# Patient Record
Sex: FEMALE | Race: WHITE | NOT HISPANIC OR LATINO | Employment: OTHER | URBAN - METROPOLITAN AREA
[De-identification: names, ages, dates, MRNs, and addresses within clinical notes are randomized per-mention and may not be internally consistent; named-entity substitution may affect disease eponyms.]

---

## 2017-01-23 ENCOUNTER — APPOINTMENT (OUTPATIENT)
Dept: MRI IMAGING | Facility: CLINIC | Age: 52
End: 2017-01-23
Attending: EMERGENCY MEDICINE
Payer: COMMERCIAL

## 2017-01-23 ENCOUNTER — HOSPITAL ENCOUNTER (EMERGENCY)
Facility: CLINIC | Age: 52
Discharge: HOME | End: 2017-01-23
Attending: EMERGENCY MEDICINE | Admitting: EMERGENCY MEDICINE
Payer: COMMERCIAL

## 2017-01-23 VITALS
DIASTOLIC BLOOD PRESSURE: 65 MMHG | OXYGEN SATURATION: 96 % | SYSTOLIC BLOOD PRESSURE: 103 MMHG | HEART RATE: 90 BPM | RESPIRATION RATE: 16 BRPM | TEMPERATURE: 97.5 F

## 2017-01-23 DIAGNOSIS — H81.12 BPPV (BENIGN PAROXYSMAL POSITIONAL VERTIGO), LEFT: ICD-10-CM

## 2017-01-23 LAB
ANION GAP SERPL CALCULATED.3IONS-SCNC: 7 MMOL/L (ref 3–14)
BUN SERPL-MCNC: 13 MG/DL (ref 7–30)
CALCIUM SERPL-MCNC: 8.9 MG/DL (ref 8.5–10.1)
CHLORIDE SERPL-SCNC: 105 MMOL/L (ref 94–109)
CO2 SERPL-SCNC: 29 MMOL/L (ref 20–32)
CREAT SERPL-MCNC: 0.93 MG/DL (ref 0.52–1.04)
GFR SERPL CREATININE-BSD FRML MDRD: 64 ML/MIN/1.7M2
GLUCOSE SERPL-MCNC: 96 MG/DL (ref 70–99)
POTASSIUM SERPL-SCNC: 4.1 MMOL/L (ref 3.4–5.3)
SODIUM SERPL-SCNC: 141 MMOL/L (ref 133–144)

## 2017-01-23 PROCEDURE — 99285 EMERGENCY DEPT VISIT HI MDM: CPT | Mod: 25

## 2017-01-23 PROCEDURE — 25500064 ZZH RX 255 OP 636: Performed by: EMERGENCY MEDICINE

## 2017-01-23 PROCEDURE — 25000128 H RX IP 250 OP 636: Performed by: EMERGENCY MEDICINE

## 2017-01-23 PROCEDURE — 70553 MRI BRAIN STEM W/O & W/DYE: CPT

## 2017-01-23 PROCEDURE — 96375 TX/PRO/DX INJ NEW DRUG ADDON: CPT

## 2017-01-23 PROCEDURE — 96374 THER/PROPH/DIAG INJ IV PUSH: CPT

## 2017-01-23 PROCEDURE — A9585 GADOBUTROL INJECTION: HCPCS | Performed by: EMERGENCY MEDICINE

## 2017-01-23 PROCEDURE — 25900017 H RX MED GY IP 259 OP 259 PS 637: Performed by: EMERGENCY MEDICINE

## 2017-01-23 PROCEDURE — 25000125 ZZHC RX 250: Performed by: EMERGENCY MEDICINE

## 2017-01-23 PROCEDURE — 96376 TX/PRO/DX INJ SAME DRUG ADON: CPT

## 2017-01-23 PROCEDURE — 80048 BASIC METABOLIC PNL TOTAL CA: CPT | Performed by: EMERGENCY MEDICINE

## 2017-01-23 RX ORDER — DIAZEPAM 10 MG/2ML
2.5 INJECTION, SOLUTION INTRAMUSCULAR; INTRAVENOUS ONCE
Status: DISCONTINUED | OUTPATIENT
Start: 2017-01-23 | End: 2017-01-23

## 2017-01-23 RX ORDER — DIAZEPAM 10 MG/2ML
2.5 INJECTION, SOLUTION INTRAMUSCULAR; INTRAVENOUS ONCE
Status: COMPLETED | OUTPATIENT
Start: 2017-01-23 | End: 2017-01-23

## 2017-01-23 RX ORDER — DIAZEPAM 5 MG
5 TABLET ORAL EVERY 8 HOURS PRN
Qty: 15 TABLET | Refills: 0 | Status: SHIPPED | OUTPATIENT
Start: 2017-01-23

## 2017-01-23 RX ORDER — GADOBUTROL 604.72 MG/ML
4.5 INJECTION INTRAVENOUS ONCE
Status: COMPLETED | OUTPATIENT
Start: 2017-01-23 | End: 2017-01-23

## 2017-01-23 RX ORDER — METOCLOPRAMIDE HYDROCHLORIDE 5 MG/ML
5 INJECTION INTRAMUSCULAR; INTRAVENOUS ONCE
Status: COMPLETED | OUTPATIENT
Start: 2017-01-23 | End: 2017-01-23

## 2017-01-23 RX ORDER — MECLIZINE HYDROCHLORIDE 25 MG/1
25 TABLET ORAL EVERY 6 HOURS PRN
Qty: 15 TABLET | Refills: 0 | Status: SHIPPED | OUTPATIENT
Start: 2017-01-23

## 2017-01-23 RX ORDER — MECLIZINE HYDROCHLORIDE 25 MG/1
25 TABLET ORAL ONCE
Status: COMPLETED | OUTPATIENT
Start: 2017-01-23 | End: 2017-01-23

## 2017-01-23 RX ORDER — ONDANSETRON 4 MG/1
4 TABLET, ORALLY DISINTEGRATING ORAL ONCE
Status: COMPLETED | OUTPATIENT
Start: 2017-01-23 | End: 2017-01-23

## 2017-01-23 RX ADMIN — MECLIZINE HYDROCHLORIDE 25 MG: 25 TABLET ORAL at 11:36

## 2017-01-23 RX ADMIN — DIAZEPAM 2.5 MG: 5 INJECTION, SOLUTION INTRAMUSCULAR; INTRAVENOUS at 13:03

## 2017-01-23 RX ADMIN — ONDANSETRON 4 MG: 4 TABLET, ORALLY DISINTEGRATING ORAL at 10:43

## 2017-01-23 RX ADMIN — DIAZEPAM 2.5 MG: 5 INJECTION, SOLUTION INTRAMUSCULAR; INTRAVENOUS at 12:32

## 2017-01-23 RX ADMIN — GADOBUTROL 4.5 ML: 604.72 INJECTION INTRAVENOUS at 13:43

## 2017-01-23 RX ADMIN — METOCLOPRAMIDE 5 MG: 5 INJECTION, SOLUTION INTRAMUSCULAR; INTRAVENOUS at 14:22

## 2017-01-23 ASSESSMENT — ENCOUNTER SYMPTOMS
PHOTOPHOBIA: 0
HEADACHES: 0
VOMITING: 0
NAUSEA: 1
DIZZINESS: 1

## 2017-01-23 NOTE — ED AVS SNAPSHOT
Emergency Department    [address] [facility] [address]    Phone:  [phone]    Fax:  [phone]                                       Candace Schneider   MRN: [id number]    Department:   Emergency Department   Date of Visit:  1/23/2017           Patient Information     Date Of Birth          1965        Your diagnoses for this visit were:     BPPV (benign paroxysmal positional vertigo), left        You were seen by Bautista Knight MD.      Follow-up Information     Call [facility].    Contact information:    [address]  [address]  [facility] [address] [phone]        Follow up with  Emergency Department.    Specialty:  EMERGENCY MEDICINE    Why:  As needed, If symptoms worsen    Contact information:    [address] [facility] [address] [phone]        Follow up with Adelina Chin MD In 5 days.    Contact information:    [facility]  [address]     [facility] [id number] [phone]          Discharge Instructions         Benign Positional Vertigo    The inner ear is located behind the middle ear. It is a part of the balance center of the body. It contains small calcium particles within fluid filled canals (semi-circular canals). These particles can move out of position as a result of aging, head trauma or disease of the inner ear. Once that happens, movement of the head into certain positions may cause the particles to stimulate the inner ear and create the feeling of vertigo.  Vertigo is a false feeling of motion (as if you or the room is spinning). A vertigo attack may cause sudden nausea, vomiting and heavy sweating. Severe vertigo causes a loss of balance and may result in falling. During an attack of vertigo, head movement and body position changes will worsen symptoms.  An episode of vertigo may last seconds, minutes or hours. Once you are over the first episode of vertigo, it may never  return. Sometimes symptoms recur off and on over several weeks or longer.  Home Care:    If symptoms are severe, rest quietly in bed. Change positions slowly. There is usually one position that will feel best, such as lying on one side or lying on your back with your head slightly raised on pillows.    Do not drive or work with dangerous machinery for one week after symptoms disappear, in case of a sudden return of symptoms.    Take medicine as prescribed to relieve your symptoms. Unless another medicine was prescribed for nausea, vomiting and vertigo, you may use over-the-counter motion sickness pills, such as meclizine (Bonine, Bonamine, Antivert) or dimenhydrinate (Dramamine).  Follow Up  with your doctor or as directed by our staff. Report any persistent ringing in the ear or hearing loss to your doctor.  [NOTE: If you had a CT or MRI scan, it will be reviewed by a specialist. You will be notified of any new findings that may affect your care.]  Get Prompt Medical Attention  if any of the following occur:    Worsening of vertigo not controlled by the medicine prescribed    Repeated vomiting not controlled by the medicine prescribed    Increased weakness or fainting    Severe headache or unusual drowsiness or confusion    Weakness of an arm or leg or one side of the face    Difficulty with speech or vision    Seizure    8312-5403 [employer]. [address] [place] [address]. All rights reserved. This information is not intended as a substitute for professional medical care. Always follow your healthcare professional's instructions.          24 Hour Appointment Hotline       To make an appointment at any [facility], call [phone] ([phone]). If you don't have a family doctor or clinic, we will help you find one. [facility] are conveniently located to serve the needs of you and your family.             Review of your medicines      START taking        Dose / Directions  Last dose taken    meclizine 25 MG tablet   Commonly known as:  ANTIVERT   Dose:  25 mg   Quantity:  15 tablet        Take 1 tablet (25 mg) by mouth every 6 hours as needed for dizziness   Refills:  0          CONTINUE these medicines which may have CHANGED, or have new prescriptions. If we are uncertain of the size of tablets/capsules you have at home, strength may be listed as something that might have changed.        Dose / Directions Last dose taken    diazepam 5 MG tablet   Commonly known as:  VALIUM   Dose:  5 mg   What changed:    - medication strength  - when to take this  - reasons to take this   Quantity:  15 tablet        Take 1 tablet (5 mg) by mouth every 8 hours as needed for other (Vertigo)   Refills:  0          Our records show that you are taking the medicines listed below. If these are incorrect, please call your family doctor or clinic.        Dose / Directions Last dose taken    mesalamine 1000 MG Suppository   Commonly known as:  CANASA   Dose:  500 mg        Place 500 mg rectally 2 times daily   Refills:  0                Prescriptions were sent or printed at these locations (2 Prescriptions)                   Other Prescriptions                Printed at Department/Unit printer (2 of 2)         meclizine (ANTIVERT) 25 MG tablet               diazepam (VALIUM) 5 MG tablet                Procedures and tests performed during your visit     Basic metabolic panel    MR Brain w/o & w Contrast      Orders Needing Specimen Collection     None      Pending Results     No orders found from 1/22/2017 to 1/24/2017.            Pending Culture Results     No orders found from 1/22/2017 to 1/24/2017.       Test Results from your hospital stay           1/23/2017  2:53 PM - Interface, Radiant Ib      Narrative     MRI OF THE BRAIN WITHOUT AND WITH CONTRAST 1/23/2017 1:42 PM     COMPARISON: None.    HISTORY:  Vertigo.     TECHNIQUE: Multi-sequence, multi-planar MRI images of the brain were  acquired before  and after the administration of IV gadolinium (4.5 mL  Gadavist).    FINDINGS: The ventricles and basal cisterns are normal in  configuration. There is no midline shift. There are no extra-axial  fluid collections. Gray-white differentiation is well maintained.  There is no evidence for stroke or acute intracranial hemorrhage.  There is no abnormal contrast enhancement in the brain or its  coverings.    There is no sinusitis or mastoiditis.        Impression     IMPRESSION: Normal brain MRI.    ANH SALGADO MD         1/23/2017 12:34 PM - Interface, Flexilab Results      Component Results     Component Value Ref Range & Units Status    Sodium 141 133 - 144 mmol/L Final    Potassium 4.1 3.4 - 5.3 mmol/L Final    Chloride 105 94 - 109 mmol/L Final    Carbon Dioxide 29 20 - 32 mmol/L Final    Anion Gap 7 3 - 14 mmol/L Final    Glucose 96 70 - 99 mg/dL Final    Urea Nitrogen 13 7 - 30 mg/dL Final    Creatinine 0.93 0.52 - 1.04 mg/dL Final    GFR Estimate 64 >60 mL/min/1.7m2 Final    Non  GFR Calc    GFR Estimate If Black 77 >60 mL/min/1.7m2 Final    African American GFR Calc    Calcium 8.9 8.5 - 10.1 mg/dL Final                Clinical Quality Measure: Blood Pressure Screening     Your blood pressure was checked while you were in the emergency department today. The last reading we obtained was  BP: 99/65 mmHg . Please read the guidelines below about what these numbers mean and what you should do about them.  If your systolic blood pressure (the top number) is less than 120 and your diastolic blood pressure (the bottom number) is less than 80, then your blood pressure is normal. There is nothing more that you need to do about it.  If your systolic blood pressure (the top number) is 120-139 or your diastolic blood pressure (the bottom number) is 80-89, your blood pressure may be higher than it should be. You should have your blood pressure rechecked within a year by a primary care provider.  If your  "systolic blood pressure (the top number) is 140 or greater or your diastolic blood pressure (the bottom number) is 90 or greater, you may have high blood pressure. High blood pressure is treatable, but if left untreated over time it can put you at risk for heart attack, stroke, or kidney failure. You should have your blood pressure rechecked by a primary care provider within the next 4 weeks.  If your provider in the emergency department today gave you specific instructions to follow-up with your doctor or provider even sooner than that, you should follow that instruction and not wait for up to 4 weeks for your follow-up visit.        Thank you for choosing [facility]       Thank you for choosing [facility] for your care. Our goal is always to provide you with excellent care. Hearing back from our patients is one way we can continue to improve our services. Please take a few minutes to complete the written survey that you may receive in the mail after you visit with us. Thank you!        [employer] Information     [employer] lets you send messages to your doctor, view your test results, renew your prescriptions, schedule appointments and more. To sign up, go to [email or web address] . Click on \"Log in\" on the left side of the screen, which will take you to the Welcome page. Then click on \"Sign up Now\" on the right side of the page.     You will be asked to enter the access code listed below, as well as some personal information. Please follow the directions to create your username and password.     Your access code is: [email or web address]  Expires: 2017 11:40 AM     Your access code will  in 90 days. If you need help or a new code, please call your [facility] clinic or [phone].        Care EveryWhere ID     This is your Care EveryWhere ID. This could be used by other organizations to access your [facility] medical records  [id number]        After Visit Summary       This is your record. Keep this with you and show to " your community pharmacist(s) and doctor(s) at your next visit.

## 2017-01-23 NOTE — DISCHARGE INSTRUCTIONS
Benign Positional Vertigo    The inner ear is located behind the middle ear. It is a part of the balance center of the body. It contains small calcium particles within fluid filled canals (semi-circular canals). These particles can move out of position as a result of aging, head trauma or disease of the inner ear. Once that happens, movement of the head into certain positions may cause the particles to stimulate the inner ear and create the feeling of vertigo.  Vertigo is a false feeling of motion (as if you or the room is spinning). A vertigo attack may cause sudden nausea, vomiting and heavy sweating. Severe vertigo causes a loss of balance and may result in falling. During an attack of vertigo, head movement and body position changes will worsen symptoms.  An episode of vertigo may last seconds, minutes or hours. Once you are over the first episode of vertigo, it may never return. Sometimes symptoms recur off and on over several weeks or longer.  Home Care:    If symptoms are severe, rest quietly in bed. Change positions slowly. There is usually one position that will feel best, such as lying on one side or lying on your back with your head slightly raised on pillows.    Do not drive or work with dangerous machinery for one week after symptoms disappear, in case of a sudden return of symptoms.    Take medicine as prescribed to relieve your symptoms. Unless another medicine was prescribed for nausea, vomiting and vertigo, you may use over-the-counter motion sickness pills, such as meclizine (Bonine, Bonamine, Antivert) or dimenhydrinate (Dramamine).  Follow Up  with your doctor or as directed by our staff. Report any persistent ringing in the ear or hearing loss to your doctor.  [NOTE: If you had a CT or MRI scan, it will be reviewed by a specialist. You will be notified of any new findings that may affect your care.]  Get Prompt Medical Attention  if any of the following occur:    Worsening of vertigo not  controlled by the medicine prescribed    Repeated vomiting not controlled by the medicine prescribed    Increased weakness or fainting    Severe headache or unusual drowsiness or confusion    Weakness of an arm or leg or one side of the face    Difficulty with speech or vision    Seizure    3349-9344 [employer]. [address] [place] [address]. All rights reserved. This information is not intended as a substitute for professional medical care. Always follow your healthcare professional's instructions.

## 2017-01-23 NOTE — ED AVS SNAPSHOT
Emergency Department    [address] [facility] [address]    Phone:  [phone]    Fax:  [phone]                                       Candace Schneider   MRN: [id number]    Department:   Emergency Department   Date of Visit:  1/23/2017           After Visit Summary Signature Page     I have received my discharge instructions, and my questions have been answered. I have discussed any challenges I see with this plan with the nurse or doctor.    ..........................................................................................................................................  Patient/Patient Representative Signature      ..........................................................................................................................................  Patient Representative Print Name and Relationship to Patient    ..................................................               ................................................  Date                                            Time    ..........................................................................................................................................  Reviewed by Signature/Title    ...................................................              ..............................................  Date                                                            Time

## 2017-01-23 NOTE — ED NOTES
Reassurance and emotional support given per writer and Madina MRI tech.  Patient given aroma therapy with orange peppermint scent on gown.  Dr. Knight notified.

## 2017-12-27 NOTE — ED PROVIDER NOTES
Patient: Zainab Danielson  : 1967  MRN:   [id number]   Date: 2017 9:24 AM  Attending: Gerard Shelley MD  Financial : [phone]         50-year-old female here for an EGD and colonoscopy for recurrence of reflux symptoms and screening colonoscopy for age-appropriate screening for colon cancer. This will be done with MAC sedation.        Past Medical History:   Diagnosis Date   • Allergic rhinitis    • Chronic headaches    • GERD (gastroesophageal reflux disease)    • Iron deficiency anemia    • Menorrhagia    • Murmur, cardiac    • Obesity          No current facility-administered medications on file prior to encounter.   Current Outpatient Prescriptions on File Prior to Encounter:  Cholecalciferol (VITAMIN D3) 27979 units Cap   Multiple Vitamin (MULTI VITAMIN DAILY PO)   DISPENSE   KRILL OIL PO   Coenzyme Q10 (COQ10 PO)   TURMERIC CURCUMIN PO   Methylsulfonylmethane (MSM) 1500 MG Tab   Glucosamine-Chondroit-Vit C-Mn (GLUCOSAMINE 1500 COMPLEX PO)   Chromium 200 MCG Cap   DISPENSE   DISPENSE       ALLERGIES:   Allergen Reactions   • Penicillins SWELLING   • Ragweed        Social History     Social History   • Marital status:      Spouse name: N/A   • Number of children: N/A   • Years of education: N/A     Occupational History   • Not on file.     Social History Main Topics   • Smoking status: Former Smoker     Packs/day: 0.25     Quit date: 2016   • Smokeless tobacco: Never Used   • Alcohol use Yes      Comment: social   • Drug use: No   • Sexual activity: Yes     Partners: Male     Other Topics Concern   • Not on file     Social History Narrative   • No narrative on file       Family History   Problem Relation Age of Onset   • Stroke Mother    • Arthritis Mother    • Asthma Mother    • Depression Mother    • Heart disease Mother    • High blood pressure Mother    • High cholesterol Mother    • Mental illness Mother    • Arthritis Maternal Grandmother    • Amblyopia Neg Hx    • Blindness Neg Hx    History     Chief Complaint:  Dizziness    HPI   Candace Schneider is a 51 year old female who presents with non improving dizziness with associated nausea that has been ongoing for the past two days. Her symptoms are similar in nature to vertigo she has had in the past and therefore tried to take Bonine and perform the Epley Maneuver which reportedly made her symptoms worse. Due to her non improving symptoms, despite trying the Epley maneuver, she elected to come to the emergency department for further evaluation. On evaluation, her symptoms are exacerbated with head movements and alleviate when she keeps her head still. To note, the patient has been seen and evaluated at a dizzy clinic for her vertigo but states that she has not been seen for quite some time. The patient does not report any headaches, vomiting, photophobia, or other related symptoms. She voices no other concerns at this time.     Allergies:  No known drug allergies      Medications:    Diazepam   Canasa      Past Medical History:    Vertigo   Ulcerative colitis      Past Surgical History:    History reviewed. No pertinent surgical history.      Family History:    History reviewed. No pertinent family history.       Social History:  The patient is not a smoker. The patient uses alcohol.   Marital Status:  Single [1]     Review of Systems   Eyes: Negative for photophobia.   Gastrointestinal: Positive for nausea. Negative for vomiting.   Neurological: Positive for dizziness. Negative for headaches.   All other systems reviewed and are negative.      Physical Exam     Patient Vitals for the past 24 hrs:   BP Temp Temp src Pulse Resp SpO2   01/23/17 1424 99/65 mmHg - - 63 16 99 %   01/23/17 1231 97/55 mmHg - - - - -   01/23/17 1019 98/51 mmHg 97.5  F (36.4  C) Oral 71 20 97 %       Physical Exam  General: Appears well-developed and well-nourished.   Head: No signs of trauma.   Mouth/Throat: Oropharynx is clear and moist.   Eyes: Conjunctivae are     • Cancer Neg Hx    • Cataracts Neg Hx    • Diabetes Neg Hx    • Glaucoma Neg Hx    • Hypertension Neg Hx    • Kidney disease Neg Hx    • Lupus Neg Hx    • Macular degeneration Neg Hx    • Retinal detachment Neg Hx    • Sjogren's Syndrome Neg Hx    • Strabismus Neg Hx    • Thyroid Neg Hx    • Tuberculosis Neg Hx        PHYSICAL EXAMINATION:  VITALS:   Visit Vitals  /87   Pulse 70   Temp 97.2 °F (36.2 °C) (Temporal Artery)   Resp 16   Ht 5' 7\" (1.702 m)   Wt 84.8 kg   SpO2 97%   BMI 29.29 kg/m²     NECK:  Without bruits or lymphadenopathy.  LUNGS:  Clear to auscultation.  HEART:  Sounds are regular without obvious murmur.  No peripheral edema or  signs of ischemia.  ABD: soft, nontender, good bowel sounds.       "normal. Pupils are equal, round, and reactive to light. +rotary nystagmus primarily with left side of head down.    Neck: Normal range of motion. No nuchal rigidity. No cervical adenopathy  CV: Normal rate and regular rhythm.    Resp: Effort normal and breath sounds normal. No respiratory distress.   GI: Soft. There is no tenderness or guarding.  Normal bowel sounds.   MSK: Normal range of motion. no edema. No Calf tenderness.  Neuro: The patient is alert and oriented to person, place, and time.  PERRLA, EOMI, strength in upper/lower extremities normal and symmetrical.   Sensation normal. Speech normal.  GCS 15  Skin: Skin is warm and dry. No rash noted.   Psych: normal mood and affect. behavior is normal.       Emergency Department Course     Imaging:  MR Brain w/o and w contrast: Normal brain MRI. Readings per radiology.     Laboratory:  BMP: WNL (Creatinine 0.93)    Interventions:  1358 Reglan 5 mg IV   1343 Gadavist 4.5 mL IV   1258 Valium 2.5 mg IV   1158 Valium 2.5 mg IV   1128 Antivert 25 mg PO  1038 Zofran 4 mg ODT     Emergency Department Course:  Past medical records, nursing notes, and vitals reviewed. I performed an exam of the patient and obtained history, as documented above. Blood was obtained. A peripheral IV was established.     1127: the patient's nausea has improved but she now is experiencing a \"room spinning: sensation when she closes her eyes     Findings and plan explained to the Patient. Patient discharged home with instructions regarding supportive care, medications, and reasons to return. The importance of close follow-up was reviewed. The patient was prescribed Valium and Antivert   Impression & Plan      Medical Decision Making:  Candace Schneider is a 51 year old female who presents due to dizziness. She has a history of vertigo and has gone to the dizzy clinic in the past although not for some time. Her symptoms have persisted for a couple of days and therefore decided to come to the ER. " On my evaluation she did have vertigo when attempting the Epley maneuver. The vertigo seemed to have a fair amount of rotary to it with fairly significant vertical component. Given this I elected to obtain an MRI to make sure there was no central cause which I did not see. With the Epley maneuver along with Meclizine and Valium she did have improvement in the vertigo. Given her long standing history of this and negative imaging, I feel this is most likely consistent with BPPV. She was discharged home with Meclizine and Valium and recommended to follow up once again in the dizzy clinic along with her primary care provider for any new or worsening symptoms.     Diagnosis:    ICD-10-CM    1. BPPV (benign paroxysmal positional vertigo), left H81.12         Disposition:  discharged to home    Discharge Medication:  New Prescriptions    DIAZEPAM (VALIUM) 5 MG TABLET    Take 1 tablet (5 mg) by mouth every 8 hours as needed for other (Vertigo)    MECLIZINE (ANTIVERT) 25 MG TABLET    Take 1 tablet (25 mg) by mouth every 6 hours as needed for dizziness       I, Gina Duran, am serving as a scribe at 10:27 AM on 1/23/2017 to document services personally performed by Bautista Knight MD based on my observations and the provider's statements to me.              Bautista Knight MD  01/23/17 [id number]

## 2019-08-14 NOTE — TELEPHONE ENCOUNTER
ONCOLOGY INTAKE: Records Information      APPT INFORMATION:  Referring provider:  Self Referred  Referring provider s clinic:  na  Reason for visit/diagnosis:  [place] Macroglobulinemia  Has patient been notified of appointment date and time?: yes, per pt    RECORDS INFORMATION:  Were the records received with the referral (via [employer])? no    Has patient been seen for any external appt for this diagnosis? yes    If yes, where? [facility]    Has patient had any imaging or procedures outside of [facility] for this condition? yes      If Yes, where? [facility]    ADDITIONAL INFORMATION:  Notes care everywhere

## 2019-08-15 NOTE — TELEPHONE ENCOUNTER
RECORDS STATUS - ALL OTHER DIAGNOSIS      RECORDS RECEIVED FROM: Thony   DATE RECEIVED:    NOTES STATUS DETAILS   OFFICE NOTE from referring provider  Epic/MALORIE Self Referred   OFFICE NOTE from medical oncologist Tiffany Hermosillo, last seen 8/14/19   DISCHARGE SUMMARY from hospital     DISCHARGE REPORT from the ER     OPERATIVE REPORT     MEDICATION LIST CE    CLINICAL TRIAL TREATMENTS TO DATE     LABS     PATHOLOGY REPORTS Requested from Thony, Reports in CE Date of Collection: 6/5/18, Case Number: [id number]    Date of Collection: 6/5/18, Case Number: [id number]    Date of Collection: 3/20/18, Case Number: [id number]   ANYTHING RELATED TO DIAGNOSIS Epic/MALORIE 8/9/19   GENONOMIC TESTING     TYPE:     IMAGING (NEED IMAGES & REPORT)     CT SCANS     MRI [facility], Report in CE, Requested 3/4/19   MAMMO     ULTRASOUND     PET

## 2019-08-21 NOTE — TELEPHONE ENCOUNTER
Slides from [facility] received, taken to 5th floor lab - [facility]. CE Refreshed, no new outside information.  9:17 AM

## 2019-08-23 PROCEDURE — 00000345 ZZHCL STATISTIC REV BONE MARROW OUTSIDE SLIDES TC 88321: Performed by: INTERNAL MEDICINE

## 2019-08-23 PROCEDURE — 00000346 ZZHCL STATISTIC REVIEW OUTSIDE SLIDES TC 88321: Performed by: INTERNAL MEDICINE

## 2019-08-26 LAB
COPATH REPORT: NORMAL
COPATH REPORT: NORMAL

## 2019-08-29 ENCOUNTER — PRE VISIT (OUTPATIENT)
Dept: TRANSPLANT | Facility: CLINIC | Age: 54
End: 2019-08-29

## 2019-09-04 SDOH — HEALTH STABILITY: MENTAL HEALTH: HOW MANY STANDARD DRINKS CONTAINING ALCOHOL DO YOU HAVE ON A TYPICAL DAY?: 1 OR 2

## 2019-09-04 SDOH — HEALTH STABILITY: MENTAL HEALTH: HOW OFTEN DO YOU HAVE A DRINK CONTAINING ALCOHOL?: 4 OR MORE TIMES A WEEK

## 2019-09-04 NOTE — PROGRESS NOTES
[facility] Consultation     Candace Schneider is a 53 year old female referred by Dr. Hemrosillo for Waldenstrom's macroglobulinemia (IgM kappa) for a second opinion.      Hematologic history:  Diagnosed with WM 6/2018 after presenting with hyperviscosity symptoms IgM 4.8 and viscosity 4.2 at diagnosis.  No splenomegaly or adenopathy at diagnosis.  80% lymphoplasmacytic cells (95% total cellularity with normal trilineage hematopoiesis) on BMBx with immunophenotype CD5/CD10/-ljlwvcbn, clonal B-cell population with dim to moderate CD20, dim CD22, very dim surface kappa light chains and partial expression of CD23. Genetic studies were reportedly not completed at diagnosis, including MyD88. Required plasmapheresis at diagnosis due to symptoms.  Initially started on velcade during admission for plasmapheresis, completed VRD induction with a monoclonal IgM kappa of 0.2g/dL at completion of induction (11/2018).   Completed ~7 months of rituximab with plan to increase interval to Q3 months starting 5/2019 due to symptoms of dizziness and b/l UE weakness (worked up by neurology without evidence of neurologic etiology).      Date Treatment Response Toxicities/Complications   6/13/2018-C1 Rituximab/dexamethasone     7/2018-10/2018 (C2-C5) Rituximab/velcade/dexamethasone VGPR >90% reduction in monoclonal protein (4.8->0.2).  Nausea with velcade subcutaneous    10/2018-5/2019 Rituximab monthly. VGPR, monoclonal protein present at 0.1 as of 8/2019 Dizziness, b/l UE weakness   5/2019 Observation            HPI:  Please see my entry above for hematologic history.  She has numerous questions today regarding her Waldenstrom's macroglobulinemia.  She has noted increased fatigue lately and wonders if this could be due to her waldenstroms.  She is gaining some weight.  No fevers, chills, nausea, vomiting, CP or SOB.  She does note intermittent night sweats, but recently went through menopause.  Appetite is good.  She is currently  working a physically active job and is frequently exhausted when she gets home from work.  No bleeding, bruising, new lumps/bumps, rashes or wounds.  She does note intermittent b/l LE neuropathy that worsened while receiving velcade (it now waxes and wanes unpredictably per her report).  She also notes intermittent visual changes, which are different than her described visual field cuts at initial diagnosis.  She denies other complaints at this time.     ASSESSMENT AND PLAN:  # Waldenstrom's macroglobulinemia, Very good partial remission.    She presents today for a second opinion regarding her WM.  She obtained VGPR after 5 cycles of velcade, rituxan, dexamethasone per review of her records from [facility], which has been maintained as of her most recent labs 8/2019 with an M-spike of 0.1g/dL IgM kappa.  She recently stopped rituxan after a discussion with her primary oncologist (last dose 5/2019) due to unclear evidence of benefit and continued fatigue.  We discussed today that being an indolent disease, that it is not curable, but is certainly manageable over time, particularly in light of numerous newly approved treatments.  We discussed that the typical initial remission is 2-5 years with additional remissions becoming progressively shorter with treatment, but that there are numerous new therapies that have been approved in the past several years, including ibrutinib.  We discussed that while the median survival for patients with her similar presentation and risk factors (2 total due to anemia and elevated beta2 microglobulin) at diagnosis Leukemia Research Volume 34, Issue 10, October 2010, Pages 0797-1865 are a median of 100 months.  This comes with the caveat of the study being nearly 10 years old and without the benefit of numerous newly approved treatments in the interval.  We discussed that she would more than likely have a relatively normal lifespan with these newly approved treatments.  We  recommend to continue monitoring at this time or if she should develop recurrent symptoms, particularly of hyperviscosity.  She plans to continue follow-up with her current oncologist at [facility] and will return as needed if she develops new symptoms or if she has lab evidence of recurrence of her WM.  We discussed that we would discuss her next line of treatment at that time.      # fatigue, likely secondary to fibromyalgia.  We discussed today that WM when in VGPR such as she is currently is unlikely to be the cause of her fatigue given her response and other medical issues.  In particular her fibromyalgia should be managed as recommended by her rheumatologist.   -Continue follow-up with rheumatology or PCP for management.      # visual changes.  She notes intermittent changes with darkening of vision in the periphery, which is different than her prior visual field cuts. Given most recent M-spike of 0.1g/dL 8/2019, this is unlikely to be related to WM or hyperviscosity.  Recommended for ophthalmology evaluation for other etiologies, which pt was agreeable to.   -Recommend ophthalmology follow-up.     ROS:    10 point ROS neg other than the symptoms noted above in the HPI.        Past Medical History:   Diagnosis Date     Anxiety      Chronic migraine      Fibromyalgia 05/2019     Gastritis      Ulcerative colitis (H)      Vertigo      Waldenstrom's macroglobulinemia with kappa light chains (H) 06/2018       Past Surgical History:   Procedure Laterality Date     NO HISTORY OF SURGERY         Family History   Problem Relation Age of Onset     Ovarian Cancer Paternal Grandmother      Leukemia No family hx of      Lymphoma No family hx of      Multiple myeloma No family hx of        Social History     Tobacco Use     Smoking status: Former Smoker     Smokeless tobacco: Never Used     Tobacco comment: Social smoker in 20s   Substance Use Topics     Alcohol use: Yes     Frequency: 4 or more times a week     Drinks per  session: 1 or 2     Drug use: None          No Known Allergies     Current Outpatient Medications   Medication Sig Dispense Refill     diazepam (VALIUM) 5 MG tablet Take 1 tablet (5 mg) by mouth every 8 hours as needed for other (Vertigo) 15 tablet 0     meclizine (ANTIVERT) 25 MG tablet Take 1 tablet (25 mg) by mouth every 6 hours as needed for dizziness 15 tablet 0     mesalamine (CANASA) 1000 MG suppository Place 500 mg rectally 2 times daily       rizatriptan (MAXALT) 10 MG tablet Take 10 mg by mouth at onset of headache for migraine       Lab and imaging studies:   Bone marrow flow cytometry 6/5/18  INTERPRETATION:  Bone marrow aspirate, flow cytometric immunophenotyping:   -CD5/CD10/-fckhguvw clonal B-cell population   -See comments    Bone marrow biopsy 6/5/18  DIAGNOSIS:  BONE MARROW (RIGHT POSTERIOR ILIAC CREST) BIOPSY, ASPIRATE, CLOT  SECTION, PARTICLE CRUSH, TOUCH IMPRINTS, AND PERIPHERAL SMEAR:          - Low-grade B-cell lymphoma, best classifiable as            lymphoplasmacytic lymphoma, 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 Bone marrow  flow cytometric immunophenotyping (see report number [id number]) shows a  CD5/CD10/-jxgzhlpv, clonal B-cell population with dim to moderate  CD20, dim CD22, very dim surface kappa light chains and partial  expression of CD23. Sections show a diffuse and interstitial infiltrate  of predominantly small mature lymphocytes, with prominent (29%)  plasmacytic differentiation.    While some of the immunophenotypic features (CD20/CD22/surface  immunoglobulin light chain expression intensities) raise the  possibility of chronic lymphocytic leukemia, overall features including  the lack of CD5  expression, conspicuous plasmacytic differentiation and  prominent IgM kappa paraproteinemia are more in keeping with  lymphoplasmacytic lymphoma/Waldenstrom macroglobulinemia.    Testing for MYD88 mutation may be performed on the clot section, if  clinically indicated.    [facility]        CT CAP 6/2018  Gabriel, Rad Results In - 06/06/2018 12:03 AM CDT  COMPARISON:  CT enterography 04/17/2018    TECHNIQUE:  Images were obtained through the chest, abdomen and pelvis following the administration of oral and 75 mL IOPAMIDOL 61 % IV SOLN contrast.    FINDINGS:      CHEST: No enlarged axillary, mediastinal or hilar lymph nodes by size criteria. Small bilateral nodules measuring less than 5 mm each. Representative examples on the right these are seen on images 38, 41-43, 46 and 54. On the left represented examples on images 32, 36, 40, 49 and 67. No pleural or pericardial effusion.    ABDOMEN/PELVIS: Previously noted hyperenhancing hepatic lesions are not identified on this exam. Nonobstructing 3 mm right renal calculus. No right hydronephrosis. Left kidney, adrenals, gallbladder and pancreas appear normal. The spleen is normal size without discrete lesion. No dilated loops of bowel. No abdominal, pelvic or inguinal adenopathy. No free fluid. No suspicious osseous lesion.    IMPRESSION  IMPRESSION:  1. Multiple bilateral small pulmonary nodules.  2. No adenopathy in the chest, abdomen or pelvis. Normal splenic size.  Physical Exam:     Vital Signs: BP 95/53   Pulse 77   Temp 98.3  F (36.8  C) (Oral)   Resp 16   Wt 54.5 kg (120 lb 2.4 oz)   SpO2 97%   BMI 19.40 kg/m      KPS:  90    General Appearance: healthy, alert and no distress  Eyes: PERRL, conjunctiva and lids normal, sclera nonicteric  Ears/Nose/M/Throat: Oral mucosa and posterior oropharynx normal, moist mucous membranes  Neck supple, non-tender, free range of motion, no adenopathy  Cardio/Vascular:regular rate and rhythm, normal S1 and S2, no  murmur  Resp Effort And Auscultation: Normal - Clear to auscultation without rales, rhonchi, or wheezing.  GI: soft, nontender, bowel sounds present in all four quadrants, no hepatosplenomegaly  Lymphatics:no significant enlargement of lymph nodes globally   Musculoskeletal: Musculoskeletal normal  Edema: none  Skin: Skin color, texture, turgor normal. No rashes or lesions.  Neurologic: Gait normal. Normal speech and mentation.  CNII-XII grossly intact, linear thought processes.   Psych/Affect: Mood and affect are appropriate.  Vascular Access:  None    Candace understood the above assessment and recommendations.  Multiple questions answered.  No barriers to learning identified.     Patient seen and discussed with Dr. Box.     Donald Cade MD, PhD  Hematology/Oncology Fellow  Pager: [phone]  5:54 PM  September 5, 2019    The patient was discussed with the clinical fellow, seen and examined by me. All labs and imaging were reviewed. I  I agree with the fellows note and have been responsible for the care plan and interpretation of progress. Any additional information to the fellows note has been documented below.      I had an extensive discussion regarding Waldenstorms , its course and prognosis and all questions were answered.  She will followup in 6 months    Vinnie Box MD        -------------------------------------------------------------------------------------------------------------------------------------------    Patient Care Team       Relationship Specialty Notifications Start End    Ligia Wood NP PCP - General   8/14/19     Phone: [phone] Fax: [phone]         [facility] [address] [facility] [address]    Bailey Hermosillo MD Resident Hematology & Oncology  8/14/19     Phone: [phone] Fax: [phone]         [facility] [place] [address] [place] [address]    Vinnie Box MD MD Hematology & Oncology  8/14/19      Phone: [phone] Fax: [phone]         [address] [facility] [address]

## 2019-09-05 ENCOUNTER — OFFICE VISIT (OUTPATIENT)
Dept: TRANSPLANT | Facility: CLINIC | Age: 54
End: 2019-09-05
Attending: INTERNAL MEDICINE
Payer: COMMERCIAL

## 2019-09-05 VITALS
SYSTOLIC BLOOD PRESSURE: 95 MMHG | OXYGEN SATURATION: 97 % | TEMPERATURE: 98.3 F | RESPIRATION RATE: 16 BRPM | WEIGHT: 120.15 LBS | HEART RATE: 77 BPM | DIASTOLIC BLOOD PRESSURE: 53 MMHG | BODY MASS INDEX: 19.4 KG/M2

## 2019-09-05 DIAGNOSIS — M25.552 PAIN OF LEFT HIP JOINT: Primary | ICD-10-CM

## 2019-09-05 PROCEDURE — G0463 HOSPITAL OUTPT CLINIC VISIT: HCPCS | Mod: ZF

## 2019-09-05 RX ORDER — RIZATRIPTAN BENZOATE 10 MG/1
10 TABLET ORAL
COMMUNITY

## 2019-09-05 ASSESSMENT — PAIN SCALES - GENERAL: PAINLEVEL: NO PAIN (0)

## 2019-09-05 NOTE — NURSING NOTE
"Oncology Rooming Note    September 5, 2019 3:58 PM   Candace Schneider is a 53 year old female who presents for:    Chief Complaint   Patient presents with     RECHECK     provider visit      Initial Vitals: BP 95/53   Pulse 77   Temp 98.3  F (36.8  C) (Oral)   Resp 16   Wt 54.5 kg (120 lb 2.4 oz)   SpO2 97%   BMI 19.40 kg/m   Estimated body mass index is 19.4 kg/m  as calculated from the following:    Height as of 6/12/14: 1.676 m (5' 5.98\").    Weight as of this encounter: 54.5 kg (120 lb 2.4 oz). Body surface area is 1.59 meters squared.  No Pain (0) Comment: Data Unavailable   No LMP recorded. Patient is postmenopausal.  Allergies reviewed: Yes  Medications reviewed: Yes    Medications: Medication refills not needed today.  Pharmacy name entered into EPIC: Data Unavailable    Clinical concerns: Patient denies fevers.  Has had nausea/vomiting with vertigo a couple of weeks ago.        SEVERINO REYNOSO RN              "

## 2019-10-01 ENCOUNTER — HEALTH MAINTENANCE LETTER (OUTPATIENT)
Age: 54
End: 2019-10-01

## 2021-01-15 ENCOUNTER — HEALTH MAINTENANCE LETTER (OUTPATIENT)
Age: 56
End: 2021-01-15

## 2021-09-04 ENCOUNTER — HEALTH MAINTENANCE LETTER (OUTPATIENT)
Age: 56
End: 2021-09-04

## 2021-10-30 ENCOUNTER — HEALTH MAINTENANCE LETTER (OUTPATIENT)
Age: 56
End: 2021-10-30

## 2022-02-19 ENCOUNTER — HEALTH MAINTENANCE LETTER (OUTPATIENT)
Age: 57
End: 2022-02-19

## 2022-08-04 ENCOUNTER — OFFICE VISIT (OUTPATIENT)
Dept: URGENT CARE | Facility: URGENT CARE | Age: 57
End: 2022-08-04
Payer: COMMERCIAL

## 2022-08-04 VITALS
OXYGEN SATURATION: 98 % | SYSTOLIC BLOOD PRESSURE: 106 MMHG | RESPIRATION RATE: 16 BRPM | DIASTOLIC BLOOD PRESSURE: 70 MMHG | HEART RATE: 75 BPM | BODY MASS INDEX: 19.7 KG/M2 | WEIGHT: 122 LBS | TEMPERATURE: 98.2 F

## 2022-08-04 DIAGNOSIS — B02.9 HERPES ZOSTER WITHOUT COMPLICATION: Primary | ICD-10-CM

## 2022-08-04 PROCEDURE — 99213 OFFICE O/P EST LOW 20 MIN: CPT | Performed by: FAMILY MEDICINE

## 2022-08-04 RX ORDER — AMITRIPTYLINE HYDROCHLORIDE 10 MG/1
TABLET ORAL
COMMUNITY
Start: 2022-08-02

## 2022-08-04 RX ORDER — LORAZEPAM 1 MG/1
TABLET ORAL
COMMUNITY
Start: 2022-04-28

## 2022-08-04 RX ORDER — CHLORAL HYDRATE 500 MG
2 CAPSULE ORAL
COMMUNITY

## 2022-08-04 RX ORDER — VALACYCLOVIR HYDROCHLORIDE 1 G/1
1000 TABLET, FILM COATED ORAL 3 TIMES DAILY
Qty: 21 TABLET | Refills: 0 | Status: SHIPPED | OUTPATIENT
Start: 2022-08-04 | End: 2022-08-11

## 2022-08-04 NOTE — PROGRESS NOTES
SUBJECTIVE: Candace Schneider is a 56 year old female presenting with a chief complaint of blisdter rash left gluteal.  Onset of symptoms was day(s) ago.  Course of illness is worsening.      Past Medical History:   Diagnosis Date     Anxiety      Chronic migraine      Fibromyalgia 05/2019     Gastritis      Ulcerative colitis (H)      Vertigo      Waldenstrom's macroglobulinemia with kappa light chains (H) 06/2018     No Known Allergies  Social History     Tobacco Use     Smoking status: Former Smoker     Smokeless tobacco: Never Used     Tobacco comment: Social smoker in 20s   Substance Use Topics     Alcohol use: Yes       ROS:  SKIN: no rash  GI: no vomiting    OBJECTIVE:  /70   Pulse 75   Temp 98.2  F (36.8  C) (Oral)   Resp 16   Wt 55.3 kg (122 lb)   SpO2 98%   BMI 19.70 kg/m  GENERAL APPEARANCE: healthy, alert and no distress  SKIN: blisters left gluteal      ICD-10-CM    1. Herpes zoster without complication  B02.9 valACYclovir (VALTREX) 1000 mg tablet       Fluids/Rest, f/u if worse/not any better

## 2022-10-16 ENCOUNTER — HEALTH MAINTENANCE LETTER (OUTPATIENT)
Age: 57
End: 2022-10-16

## 2023-04-01 ENCOUNTER — HEALTH MAINTENANCE LETTER (OUTPATIENT)
Age: 58
End: 2023-04-01

## 2023-11-04 ENCOUNTER — HEALTH MAINTENANCE LETTER (OUTPATIENT)
Age: 58
End: 2023-11-04

## 2023-12-15 ENCOUNTER — OFFICE VISIT (OUTPATIENT)
Dept: URGENT CARE | Facility: URGENT CARE | Age: 58
End: 2023-12-15
Payer: COMMERCIAL

## 2023-12-15 ENCOUNTER — HOSPITAL ENCOUNTER (EMERGENCY)
Facility: CLINIC | Age: 58
Discharge: HOME | End: 2023-12-15
Attending: EMERGENCY MEDICINE | Admitting: EMERGENCY MEDICINE
Payer: COMMERCIAL

## 2023-12-15 VITALS
BODY MASS INDEX: 19.29 KG/M2 | WEIGHT: 120 LBS | TEMPERATURE: 98.3 F | OXYGEN SATURATION: 96 % | HEIGHT: 66 IN | RESPIRATION RATE: 18 BRPM | DIASTOLIC BLOOD PRESSURE: 61 MMHG | SYSTOLIC BLOOD PRESSURE: 106 MMHG | HEART RATE: 75 BPM

## 2023-12-15 VITALS
TEMPERATURE: 97.5 F | HEART RATE: 84 BPM | DIASTOLIC BLOOD PRESSURE: 73 MMHG | OXYGEN SATURATION: 97 % | RESPIRATION RATE: 18 BRPM | BODY MASS INDEX: 19.96 KG/M2 | SYSTOLIC BLOOD PRESSURE: 116 MMHG | WEIGHT: 123.6 LBS

## 2023-12-15 DIAGNOSIS — H53.8 BLURRED VISION: ICD-10-CM

## 2023-12-15 DIAGNOSIS — H53.9 VISION CHANGES: ICD-10-CM

## 2023-12-15 DIAGNOSIS — H53.8 FLASHING LIGHTS: ICD-10-CM

## 2023-12-15 DIAGNOSIS — H57.9 EYE PROBLEM: Primary | ICD-10-CM

## 2023-12-15 PROCEDURE — 99282 EMERGENCY DEPT VISIT SF MDM: CPT

## 2023-12-15 PROCEDURE — 99213 OFFICE O/P EST LOW 20 MIN: CPT | Performed by: FAMILY MEDICINE

## 2023-12-15 RX ORDER — TETRACAINE HYDROCHLORIDE 5 MG/ML
2 SOLUTION OPHTHALMIC ONCE
Status: DISCONTINUED | OUTPATIENT
Start: 2023-12-15 | End: 2023-12-15 | Stop reason: HOSPADM

## 2023-12-15 ASSESSMENT — VISUAL ACUITY
OD: 20/30
OS: 20/25

## 2023-12-15 ASSESSMENT — ACTIVITIES OF DAILY LIVING (ADL): ADLS_ACUITY_SCORE: 35

## 2023-12-16 NOTE — PROGRESS NOTES
Chief Complaint   Patient presents with    Eye Problem     Flashing sensation in the left eye for the last few hours. No pain or itching. States she usually get this with migraine.        Candace was seen today for eye problem.    Diagnoses and all orders for this visit:    Eye problem    Blurred vision      Reviewed with patient that symptoms could be related to migraine but as flashes of light has been persistent and she has blurry vision patient was encouraged to follow-up in the ER for further evaluation.  Differential diagnosis retinal detachment/glaucoma/macular degeneration/migraine/ TIA    SUBJECTIVE:  Candace Schneider is a 58 year old female with a chief complaint of seeing bright flashes of light on the periphery of the left eye which has been going on for last 2 hours.  She also has been noticing some blurry vision.  Patient does have history of migraine.  She has no pain or itching in the eye   onset of symptoms was 2 hour(s) ago.    Course of illness: sudden onset.  Severity moderate  Current and Associated symptoms: none   Treatment measures tried include None tried.  Predisposing factors include None.    Past Medical History:   Diagnosis Date    Anxiety     Chronic migraine     Fibromyalgia 05/2019    Gastritis     Ulcerative colitis (H)     Vertigo     Waldenstrom's macroglobulinemia with kappa light chains (H) 06/2018     Current Outpatient Medications   Medication Sig Dispense Refill    amitriptyline (ELAVIL) 10 MG tablet       fish oil-omega-3 fatty acids 1000 MG capsule Take 2 g by mouth      linaclotide (LINZESS) 145 MCG capsule Take 1 Capsule by mouth daily before breakfast.      meclizine (ANTIVERT) 25 MG tablet Take 1 tablet (25 mg) by mouth every 6 hours as needed for dizziness 15 tablet 0    mesalamine (CANASA) 1000 MG suppository Place 500 mg rectally 2 times daily      rizatriptan (MAXALT) 10 MG tablet Take 10 mg by mouth at onset of headache for migraine      diazepam (VALIUM) 5 MG tablet  Take 1 tablet (5 mg) by mouth every 8 hours as needed for other (Vertigo) (Patient not taking: Reported on 12/15/2023) 15 tablet 0    LORazepam (ATIVAN) 1 MG tablet TAKE 1 TABLET BY MOUTH 30 MINUTES PRIOR TO SCAN (Patient not taking: Reported on 12/15/2023)      valACYclovir (VALTREX) 1000 mg tablet Take 1 tablet (1,000 mg) by mouth 3 times daily for 7 days 21 tablet 0     Social History     Tobacco Use    Smoking status: Former    Smokeless tobacco: Never    Tobacco comments:     Social smoker in 20s   Substance Use Topics    Alcohol use: Yes       ROS:  Review of systems negative except as stated above.    OBJECTIVE:   /73 (BP Location: Left arm, Patient Position: Sitting, Cuff Size: Adult Regular)   Pulse 84   Temp 97.5  F (36.4  C) (Oral)   Resp 18   Wt 56.1 kg (123 lb 9.6 oz)   SpO2 97%   BMI 19.96 kg/m    GENERAL APPEARANCE: healthy, alert and no distress  EYES: EOMI,  PERRL, conjunctiva clear  HENT: ear canals and TM's normal.  Nose normal.  Pharynx no erythema   NECK: supple, non-tender to palpation, no adenopathy noted  SKIN: no suspicious lesions or rashes  NEURO: Normal strength and tone, sensory exam grossly normal,  normal speech and mentation  PSYCH: mentation appears normal    Isela Castellanos MD

## 2023-12-16 NOTE — ED TRIAGE NOTES
"Pt reports \"like a lighting strike\" out of left . Pt denies pain. Pt has noted floaters earlier.  Symptoms started 3-4 hours ago        "

## 2023-12-16 NOTE — DISCHARGE INSTRUCTIONS
Please follow-up with the eye doctor soon as possible.  Please return if your vision changes you develop any vision loss or other neurologic changes.    Discharge Instructions  Migraine    You were seen today for a headache that your provider thinks is likely a migraine. At this time your provider does not find that your headache is a sign of anything dangerous or life-threatening.  However, sometimes the signs of serious illness do not show up right away.      Generally, every Emergency Department visit should have a follow-up clinic visit with either a primary or a specialty clinic/provider. Please follow-up as instructed by your emergency provider today.    Return to the Emergency Department if:  You get a fever of 100.4 F or higher.  You get a stiff neck with your headache.  You get a new headache that is different or worse than headaches you have had before.  You are vomiting (throwing up) and cannot keep food or water down.  You have blurry or double vision or other problems with your eyes.  You have a new weakness on one side of your body.  You have difficulty with balance which is new.  You or your family thinks you are confused.  You have a seizure.    Treatment:  Often, treatment for your migraine will take some time to make you headache stop.  Going home to sleep can be very effective.  Use your medications as directed; overuse of medications can actually cause headaches.  Once your headache has gone away, avoid triggers such as certain foods, skipping meals, bright lights, changes in sleep, exercise and stress.  Migraine headaches can have symptoms before the pain starts, like vision changes, funny smells/tastes, dizziness or other symptoms.  Treating a headache as soon as the first symptoms come on is very important and gives the best chance of stopping the headache.  If headaches are severe or frequent, you may need to start daily medication to prevent the headaches.  Carbon monoxide can cause  headaches, so not burning things in your home is important.  Also get a carbon monoxide detector.  Some medications for migraines may raise your blood pressure, so use with caution if you have high blood pressure or heart problems.  If you were given a prescription for medicine here today, be sure to read all of the information (including the package insert) that comes with your prescription.  This will include important information about the medicine, its side effects, and any warnings that you need to know about.  The pharmacist who fills the prescription can provide more information and answer questions you may have about the medicine.  If you have questions or concerns that the pharmacist cannot address, please call or return to the Emergency Department.   Remember that you can always come back to the Emergency Department if you are not able to see your regular provider in the amount of time listed above, if you get any new symptoms, or if there is anything that worries you.

## 2023-12-16 NOTE — ED PROVIDER NOTES
"  History     Chief Complaint:  Eye Problem     HPI   Candace Schneider is a 58 year old female with history of migraine and vertigo who presents for evaluation of an eye problem. The patient reports \"lightning strike\" with floaters in her left eye that started four hours ago. Notes that it came on every few seconds, but adds that she has not had the sensation for a while. States that this is different than the light aura she gets with her migraines because it lasted longer. Notes she had a migraine this morning that has since resolved. Denies eye pain, cough, congestion, rhinorrhea, fever, chills, eye surgery, contact lenses.    Independent Historian:   None - Patient Only    Review of External Notes:   I reviewed urgent care note from earlier today when the patient was seen for the same eye problem.      Medications:    Meclizine  Valacyclovir  Amitriptyline  Linaclotide  Mesalamine   Cyclobenzaprine   Rizatriptan     Past Medical History:    Anxiety  Migraine  Fibromyalgia  Gastritis  Ulcerative colitis  Vertigo  Waldenstrom's macroglobulinemia with kappa light chains  Depression  Anemia  Skin cancer, face    Past Surgical History:    Mohs surgery      Physical Exam   Patient Vitals for the past 24 hrs:   BP Temp Temp src Pulse Resp SpO2 Height Weight   12/15/23 1958 103/87 98.3  F (36.8  C) Temporal 87 18 100 % 1.676 m (5' 6\") 54.4 kg (120 lb)      Physical Exam  Constitutional: Well appearing.  HEENT: Atraumatic.  PERRL.  EOMI. sclera normal bilaterally.  Funduscopic exam unremarkable in the left eye.  Under slit-lamp exam, there are no anterior chamber cells or flares.  No hyphema.  With bluelight and fluorescein, there is no corneal abrasions or corneal uptake of fluorescein.  No dendritic lesions.  Intraocular pressure is 10.  No proptosis.  No periorbital erythema or swelling.  Moist mucous membranes.  Neck: Soft.  Supple.    Cardiac: Regular rate and rhythm.  No murmur or rub.  Respiratory:No respiratory " distress.   Abdomen: Soft and nontender.  No guarding.  Nondistended.  Musculoskeletal: No edema.  Normal range of motion.  Neurologic: Alert and oriented x3.  Normal tone and bulk.  No facial drooping.  Normal speech.  5/5 strength in bilateral upper and lower extremities.  Sensation to light touch intact throughout.  Normal gait.  Skin: No rashes.  No edema.  Psych: Normal affect.  Normal behavior.            Emergency Department Course   Emergency Department Course & Assessments:     Interventions:  Medications   tetracaine (PONTOCAINE) 0.5 % ophthalmic solution 2 drop (has no administration in time range)   fluorescein (FUL-ALCIDES) ophthalmic strip 1 strip (has no administration in time range)      Assessments:  2048 I obtained history and examined the patient as noted above.   2110 I performed more detailed eye examination after numbing the patient's eye.     Independent Interpretation (X-rays, CTs, rhythm strip):  None    Consultations/Discussion of Management or Tests:  None        Social Determinants of Health affecting care:   None    Disposition:  The patient was discharged to home.     Impression & Plan    Medical Decision Making:  Candace Schneider is a 58-year-old woman who is afebrile and hemodynamically stable.  She is neurologically intact with no focal deficits.  Her symptoms are now all gone related to her flashing lights in the left periphery of her left eye.  No vision loss.  She did have slight headache earlier.  Does have a history of ocular migraines and differential includes ocular migraine symptoms versus vitreous hemorrhage versus other.  Visual acuity 20/25 on the left and 20/30 in the right.  Slit-lamp exam is unremarkable and no dendritic lesions seen on bluelight and no abrasion seen with fluorescein stain.  Intraocular pressure normal.  Her neurologic exam is normal.  She declined any treatment for migraines such as migraine cocktail.  I do not think an MRI for further emergent imaging of  the head is indicated given her lack of vision loss and neurologic changes this is not consistent with CVA.  Bedside ultrasound revealed no evidence of retinal detachment and symptoms not consistent with a retinal detachment.  We discussed plan for discharge home with close ophthalmology follow-up and information provided for clinic should call and make an appointment.  Given her symptoms all resolved this seems reasonable and she feels comfortable.  Discussed supportive care at home and very strict return precautions including any vision loss, neurologic changes, or other concerning symptoms.  Her questions were answered and she was in no distress at time of discharge.        Diagnosis:    ICD-10-CM    1. Flashing lights  H53.8       2. Vision changes  H53.9           Scribe Disclosure:  I, Becky Okeefe, am serving as a scribe at 8:08 PM on 12/15/2023 to document services personally performed by Donald Clinton MD based on my observations and the provider's statements to me.     12/15/2023   Donald Clinton MD Salay, Nicholas J, MD  12/16/23 1744

## 2024-01-13 ENCOUNTER — HEALTH MAINTENANCE LETTER (OUTPATIENT)
Age: 59
End: 2024-01-13

## 2024-04-25 ENCOUNTER — HOSPITAL ENCOUNTER (EMERGENCY)
Facility: CLINIC | Age: 59
Discharge: HOME | End: 2024-04-25
Attending: PHYSICIAN ASSISTANT | Admitting: PHYSICIAN ASSISTANT
Payer: COMMERCIAL

## 2024-04-25 VITALS
DIASTOLIC BLOOD PRESSURE: 81 MMHG | SYSTOLIC BLOOD PRESSURE: 111 MMHG | TEMPERATURE: 97.5 F | WEIGHT: 123.46 LBS | BODY MASS INDEX: 19.84 KG/M2 | OXYGEN SATURATION: 96 % | HEIGHT: 66 IN | HEART RATE: 79 BPM | RESPIRATION RATE: 14 BRPM

## 2024-04-25 DIAGNOSIS — R42 LIGHT-HEADEDNESS: ICD-10-CM

## 2024-04-25 DIAGNOSIS — R53.83 FATIGUE: ICD-10-CM

## 2024-04-25 LAB
ALBUMIN SERPL BCG-MCNC: 3.9 G/DL (ref 3.5–5.2)
ALBUMIN UR-MCNC: NEGATIVE MG/DL
ALP SERPL-CCNC: 88 U/L (ref 40–150)
ALT SERPL W P-5'-P-CCNC: 174 U/L (ref 0–50)
ANION GAP SERPL CALCULATED.3IONS-SCNC: 12 MMOL/L (ref 7–15)
APPEARANCE UR: CLEAR
AST SERPL W P-5'-P-CCNC: 42 U/L (ref 0–45)
BASOPHILS # BLD AUTO: 0 10E3/UL (ref 0–0.2)
BASOPHILS NFR BLD AUTO: 0 %
BILIRUB SERPL-MCNC: 0.7 MG/DL
BILIRUB UR QL STRIP: NEGATIVE
BUN SERPL-MCNC: 16.2 MG/DL (ref 6–20)
CALCIUM SERPL-MCNC: 8.5 MG/DL (ref 8.6–10)
CHLORIDE SERPL-SCNC: 106 MMOL/L (ref 98–107)
COLOR UR AUTO: ABNORMAL
CREAT SERPL-MCNC: 0.77 MG/DL (ref 0.51–0.95)
DEPRECATED HCO3 PLAS-SCNC: 23 MMOL/L (ref 22–29)
EGFRCR SERPLBLD CKD-EPI 2021: 89 ML/MIN/1.73M2
EOSINOPHIL # BLD AUTO: 0 10E3/UL (ref 0–0.7)
EOSINOPHIL NFR BLD AUTO: 0 %
ERYTHROCYTE [DISTWIDTH] IN BLOOD BY AUTOMATED COUNT: 16.8 % (ref 10–15)
FLUAV RNA SPEC QL NAA+PROBE: NEGATIVE
FLUBV RNA RESP QL NAA+PROBE: NEGATIVE
GLUCOSE SERPL-MCNC: 90 MG/DL (ref 70–99)
GLUCOSE UR STRIP-MCNC: NEGATIVE MG/DL
HCT VFR BLD AUTO: 30.2 % (ref 35–47)
HGB BLD-MCNC: 10 G/DL (ref 11.7–15.7)
HGB UR QL STRIP: NEGATIVE
HOLD SPECIMEN: NORMAL
HOLD SPECIMEN: NORMAL
IMM GRANULOCYTES # BLD: 0.3 10E3/UL
IMM GRANULOCYTES NFR BLD: 4 %
KETONES UR STRIP-MCNC: NEGATIVE MG/DL
LEUKOCYTE ESTERASE UR QL STRIP: ABNORMAL
LYMPHOCYTES # BLD AUTO: 1.7 10E3/UL (ref 0.8–5.3)
LYMPHOCYTES NFR BLD AUTO: 30 %
MAGNESIUM SERPL-MCNC: 2 MG/DL (ref 1.7–2.3)
MCH RBC QN AUTO: 33.7 PG (ref 26.5–33)
MCHC RBC AUTO-ENTMCNC: 33.1 G/DL (ref 31.5–36.5)
MCV RBC AUTO: 102 FL (ref 78–100)
MONOCYTES # BLD AUTO: 0.5 10E3/UL (ref 0–1.3)
MONOCYTES NFR BLD AUTO: 9 %
MUCOUS THREADS #/AREA URNS LPF: PRESENT /LPF
NEUTROPHILS # BLD AUTO: 3.1 10E3/UL (ref 1.6–8.3)
NEUTROPHILS NFR BLD AUTO: 57 %
NITRATE UR QL: NEGATIVE
NRBC # BLD AUTO: 0.1 10E3/UL
NRBC BLD AUTO-RTO: 2 /100
PH UR STRIP: 5.5 [PH] (ref 5–7)
PLATELET # BLD AUTO: 200 10E3/UL (ref 150–450)
POTASSIUM SERPL-SCNC: 3.7 MMOL/L (ref 3.4–5.3)
PROT SERPL-MCNC: 6.2 G/DL (ref 6.4–8.3)
RBC # BLD AUTO: 2.97 10E6/UL (ref 3.8–5.2)
RBC URINE: 1 /HPF
RSV RNA SPEC NAA+PROBE: NEGATIVE
SARS-COV-2 RNA RESP QL NAA+PROBE: NEGATIVE
SODIUM SERPL-SCNC: 141 MMOL/L (ref 135–145)
SP GR UR STRIP: 1.02 (ref 1–1.03)
SQUAMOUS EPITHELIAL: <1 /HPF
TROPONIN T SERPL HS-MCNC: <6 NG/L
TSH SERPL DL<=0.005 MIU/L-ACNC: 3.59 UIU/ML (ref 0.3–4.2)
UROBILINOGEN UR STRIP-MCNC: NORMAL MG/DL
WBC # BLD AUTO: 5.6 10E3/UL (ref 4–11)
WBC URINE: 4 /HPF

## 2024-04-25 PROCEDURE — 83735 ASSAY OF MAGNESIUM: CPT | Performed by: PHYSICIAN ASSISTANT

## 2024-04-25 PROCEDURE — 85025 COMPLETE CBC W/AUTO DIFF WBC: CPT | Performed by: PHYSICIAN ASSISTANT

## 2024-04-25 PROCEDURE — 96361 HYDRATE IV INFUSION ADD-ON: CPT

## 2024-04-25 PROCEDURE — 84443 ASSAY THYROID STIM HORMONE: CPT | Performed by: PHYSICIAN ASSISTANT

## 2024-04-25 PROCEDURE — 82374 ASSAY BLOOD CARBON DIOXIDE: CPT | Performed by: PHYSICIAN ASSISTANT

## 2024-04-25 PROCEDURE — 258N000003 HC RX IP 258 OP 636: Performed by: PHYSICIAN ASSISTANT

## 2024-04-25 PROCEDURE — 96360 HYDRATION IV INFUSION INIT: CPT

## 2024-04-25 PROCEDURE — 99284 EMERGENCY DEPT VISIT MOD MDM: CPT | Mod: 25

## 2024-04-25 PROCEDURE — 81001 URINALYSIS AUTO W/SCOPE: CPT | Performed by: PHYSICIAN ASSISTANT

## 2024-04-25 PROCEDURE — 93005 ELECTROCARDIOGRAM TRACING: CPT

## 2024-04-25 PROCEDURE — 84155 ASSAY OF PROTEIN SERUM: CPT | Performed by: PHYSICIAN ASSISTANT

## 2024-04-25 PROCEDURE — 84484 ASSAY OF TROPONIN QUANT: CPT | Performed by: PHYSICIAN ASSISTANT

## 2024-04-25 PROCEDURE — 87637 SARSCOV2&INF A&B&RSV AMP PRB: CPT | Performed by: PHYSICIAN ASSISTANT

## 2024-04-25 PROCEDURE — 36415 COLL VENOUS BLD VENIPUNCTURE: CPT | Performed by: PHYSICIAN ASSISTANT

## 2024-04-25 RX ADMIN — SODIUM CHLORIDE 1000 ML: 9 INJECTION, SOLUTION INTRAVENOUS at 09:46

## 2024-04-25 ASSESSMENT — COLUMBIA-SUICIDE SEVERITY RATING SCALE - C-SSRS
1. IN THE PAST MONTH, HAVE YOU WISHED YOU WERE DEAD OR WISHED YOU COULD GO TO SLEEP AND NOT WAKE UP?: NO
6. HAVE YOU EVER DONE ANYTHING, STARTED TO DO ANYTHING, OR PREPARED TO DO ANYTHING TO END YOUR LIFE?: NO
2. HAVE YOU ACTUALLY HAD ANY THOUGHTS OF KILLING YOURSELF IN THE PAST MONTH?: NO

## 2024-04-25 ASSESSMENT — ACTIVITIES OF DAILY LIVING (ADL)
ADLS_ACUITY_SCORE: 35
ADLS_ACUITY_SCORE: 35

## 2024-04-25 NOTE — ED NOTES
"Agree with triage. Patient currently denies pain, nausea, weakness, or dizziness. Patient ambulated to the bathroom with a steady and even gait. Patient denied any symptoms during ambulation. Patient states that she is feeling \"more normal\" now. Will continue with plan of care.   "

## 2024-04-25 NOTE — ED TRIAGE NOTES
Pt presents with lightheadedness and near syncope. Pt has waldenstrom's lymphoma last chemo 4/22. Pt also has recent hx acute thrombosis of right internal jugular. Pt on eliquis. Increased fatigue. A & Ox4.       Triage Assessment (Adult)       Row Name 04/25/24 0903          Triage Assessment    Airway WDL WDL        Respiratory WDL    Respiratory WDL WDL        Cardiac WDL    Cardiac WDL X  intermittent irregular HR        Cognitive/Neuro/Behavioral WDL    Cognitive/Neuro/Behavioral WDL X  lightheaded

## 2024-04-25 NOTE — ED PROVIDER NOTES
History     Chief Complaint:  Dizziness and Nausea       HPI   Candace Schneider is a 58 year old female on eliquis with history of vertigo who presents with light headedness during a shower today. Notes feeling weak bilaterally currently but has not had light headedness since that initial episode. She does occasionally have right sided blurry vision which is not new for her and is a longstanding issue though not currently. Denies headache, neck pain, other pain, and double vision. No blood in her vomit, stool, urine, or vaginal discharge. No leg pain or swelling. No fever, chills, cough, sore throat, congestion, vomiting, tinnitus, neck pain, diarrhea, rash. Patient is on chemotherapy for waldenstrom's lymphoma and his last chemo was 4/22/24. She started eliquis on 4/13/24 after a blood clot was found in her right jugular vein when her port was accessed. No room spinning sensation, or balance issues.  At present she feels fatigued but otherwise well.    Independent Historian:   Mother states she has been walking normally.    Review of External Notes:   I reviewed Dr. Cabrera [facility] hospitalist discharge summary from 4/12/2024 where the patient was discharged after diagnosis of Waldenström macroglobulinemia, migraine, anemia, and thrombosis of right internal jugular vein.  Also orthostatic hypotension and intracranial hypotension.    Medications:    amitriptyline (ELAVIL) 10 MG tablet  diazepam (VALIUM) 5 MG tablet  fish oil-omega-3 fatty acids 1000 MG capsule  linaclotide (LINZESS) 145 MCG capsule  LORazepam (ATIVAN) 1 MG tablet  meclizine (ANTIVERT) 25 MG tablet  mesalamine (CANASA) 1000 MG suppository  rizatriptan (MAXALT) 10 MG tablet  valACYclovir (VALTREX) 1000 mg tablet    Compazine   Eliquis     Past Medical History:    Past Medical History:   Diagnosis Date    Anxiety     Chronic migraine     Fibromyalgia 05/2019    Gastritis     Ulcerative colitis (H)     Vertigo     Waldenstrom's  "macroglobulinemia with kappa light chains (H) 06/2018   Blood clot     Physical Exam   Patient Vitals for the past 24 hrs:   BP Temp Temp src Pulse Resp SpO2 Height Weight   04/25/24 1041 103/68 -- -- 71 (!) 7 98 % -- --   04/25/24 1031 105/65 -- -- 74 (!) 8 97 % -- --   04/25/24 1025 -- -- -- 76 -- 98 % -- --   04/25/24 1015 121/61 -- -- 75 -- 99 % -- --   04/25/24 1007 104/67 -- -- 70 (!) 9 98 % -- --   04/25/24 0945 107/59 -- -- 71 17 98 % -- --   04/25/24 0930 105/66 -- -- 68 -- 98 % -- --   04/25/24 0917 110/68 97.5  F (36.4  C) Oral 70 18 98 % -- --   04/25/24 0902 98/56 97  F (36.1  C) Temporal 72 18 99 % 1.676 m (5' 6\") 56 kg (123 lb 7.3 oz)        Physical Exam  General: Awake, alert, non-toxic.  Head:  Scalp is NC/AT  Eyes:  Conjunctiva normal, PERRL  ENT:  The external nose and ears are normal.   Neck:  Normal range of motion without rigidity.  CV:  Regular rate and rhythm    No pathologic murmur, rubs, or gallops.  Resp:  Breath sounds are clear bilaterally    Non-labored, no retractions or accessory muscle use  Abdomen: Abdomen is soft, no distension, no tenderness, no masses, no CVA ttp  MS:  No lower extremity edema/swelling. No midline cervical, thoracic, or lumbar tenderness.  Extremities without joint swelling or redness.  Skin:  Warm and dry, No rash or lesions noted. No redness or swelling to suture site of recent port removal.  Neuro:  Alert and oriented. GCS 15 5/5 strength BL to all joints of upper and lower extremities.  Normal and symmetric sensation to touch BL in arms, legs, and face.  CNII-XII intact.  Normal finger to nose testing BL. Gait normal.  Psych: Awake. Alert. Normal affect. Appropriate interactions.      Emergency Department Course   ECG  ECG results from 04/25/24   EKG 12-lead, tracing only     Value    Systolic Blood Pressure     Diastolic Blood Pressure     Ventricular Rate 68    Atrial Rate 68    MA Interval 184    QRS Duration 80        QTc 431    P Axis 54    R AXIS " 11    T Axis 38    Interpretation ECG      Sinus rhythm  Normal ECG  When compared with ECG of 23-AUG-2008 16:27,  No significant change was found        Laboratory:  Labs Ordered and Resulted from Time of ED Arrival to Time of ED Departure   COMPREHENSIVE METABOLIC PANEL - Abnormal       Result Value    Sodium 141      Potassium 3.7      Carbon Dioxide (CO2) 23      Anion Gap 12      Urea Nitrogen 16.2      Creatinine 0.77      GFR Estimate 89      Calcium 8.5 (*)     Chloride 106      Glucose 90      Alkaline Phosphatase 88      AST 42       (*)     Protein Total 6.2 (*)     Albumin 3.9      Bilirubin Total 0.7     ROUTINE UA WITH MICROSCOPIC REFLEX TO CULTURE - Abnormal    Color Urine Light Yellow      Appearance Urine Clear      Glucose Urine Negative      Bilirubin Urine Negative      Ketones Urine Negative      Specific Gravity Urine 1.022      Blood Urine Negative      pH Urine 5.5      Protein Albumin Urine Negative      Urobilinogen Urine Normal      Nitrite Urine Negative      Leukocyte Esterase Urine Small (*)     Mucus Urine Present (*)     RBC Urine 1      WBC Urine 4      Squamous Epithelials Urine <1     CBC WITH PLATELETS AND DIFFERENTIAL - Abnormal    WBC Count 5.6      RBC Count 2.97 (*)     Hemoglobin 10.0 (*)     Hematocrit 30.2 (*)      (*)     MCH 33.7 (*)     MCHC 33.1      RDW 16.8 (*)     Platelet Count 200      % Neutrophils 57      % Lymphocytes 30      % Monocytes 9      % Eosinophils 0      % Basophils 0      % Immature Granulocytes 4      NRBCs per 100 WBC 2 (*)     Absolute Neutrophils 3.1      Absolute Lymphocytes 1.7      Absolute Monocytes 0.5      Absolute Eosinophils 0.0      Absolute Basophils 0.0      Absolute Immature Granulocytes 0.3      Absolute NRBCs 0.1     TROPONIN T, HIGH SENSITIVITY - Normal    Troponin T, High Sensitivity <6     TSH WITH FREE T4 REFLEX - Normal    TSH 3.59     MAGNESIUM - Normal    Magnesium 2.0     INFLUENZA A/B, RSV, & SARS-COV2 PCR         Procedures   None     Emergency Department Course & Assessments:  Interventions:  Medications   sodium chloride 0.9% BOLUS 1,000 mL (1,000 mLs Intravenous $New Bag 4/25/24 6185)        Independent Interpretation (X-rays, CTs, rhythm strip):  None    Consultations/Discussion of Management or Tests:   Past medical records, nursing notes, and vitals reviewed.  0918: I performed an exam of the patient and obtained history, as documented above.  1131 I rechecked the patient and explained findings.     Social Determinants of Health affecting care:   None    Disposition:  The patient was discharged.     Impression & Plan    CMS Diagnoses: none       Medical Decision Making:  Pleasant 58-year-old female with a history of Waldenström's macro lobular anemia, DVT on Eliquis, who presents for evaluation after a brief episode of lightheadedness while in the shower earlier as well as some generalized fatigue.  Broad differential considered.  On exam well-appearing and afebrile.  No longer feels dizzy but just feels tired.  EKG is normal.  Troponin is undetectable no chest pain shortness of breath palpitations cardiac murmur heard or other findings to suggest ACS, PE, dissection, tamponade, valvular catastrophe etc. and very low suspicion for dysrhythmia as a source of the patient's symptoms earlier today.  She does have some mild stable anemia.  Glucose and electrolytes are otherwise unremarkable.  She is afebrile with normal white blood cell count and no infectious symptoms UA is clear viral swab is negative.  Does have recent diagnosis of right-sided jugular clot secondary to central venous catheter which has since been removed.  She is on Eliquis.  Completely normal neurologic exam with no focal deficits no headache dizziness ongoing etc. to suggest stroke or intracranial hemorrhage and I do not suspect extension of venous clot, no signs of increased ICP I do not think repeat intracranial imaging is indicated.  Symptoms do  not sound like vertigo.    Strongly suspect vasovagal/orthostatic episode as this occurred while in the shower earlier with resolution of dizziness.  She has been able to ambulate without difficulty here and wants to go home.  I will have her follow-up closely with her primary care provider as well as her oncologist. Return if recurrent dizziness, syncope, fevers, chest pain, shortness of breath, neurologic changes, headache, vomiting or other concerns.      Diagnosis:    ICD-10-CM    1. Light-headedness  R42     Resolved      2. Fatigue  R53.83            Discharge Medications:  New Prescriptions    No medications on file          Scribe Disclosure:  I, Marcelo Sofia, am serving as a scribe at 9:29 AM on 4/25/2024 to document services personally performed by Jamar Galvan PA-C based on my observations and the provider's statements to me.   4/25/2024   Jamar Galvan PA-C Etten, Clark Ellsworth, PA-C  04/25/24 8529

## 2024-04-26 LAB
ATRIAL RATE - MUSE: 68 BPM
DIASTOLIC BLOOD PRESSURE - MUSE: NORMAL MMHG
INTERPRETATION ECG - MUSE: NORMAL
P AXIS - MUSE: 54 DEGREES
PR INTERVAL - MUSE: 184 MS
QRS DURATION - MUSE: 80 MS
QT - MUSE: 406 MS
QTC - MUSE: 431 MS
R AXIS - MUSE: 11 DEGREES
SYSTOLIC BLOOD PRESSURE - MUSE: NORMAL MMHG
T AXIS - MUSE: 38 DEGREES
VENTRICULAR RATE- MUSE: 68 BPM

## 2025-01-26 ENCOUNTER — HEALTH MAINTENANCE LETTER (OUTPATIENT)
Age: 60
End: 2025-01-26

## 2025-04-08 ENCOUNTER — HOSPITAL ENCOUNTER (EMERGENCY)
Facility: CLINIC | Age: 60
Discharge: HOME | End: 2025-04-08
Attending: EMERGENCY MEDICINE | Admitting: EMERGENCY MEDICINE
Payer: COMMERCIAL

## 2025-04-08 VITALS
SYSTOLIC BLOOD PRESSURE: 119 MMHG | WEIGHT: 123 LBS | HEIGHT: 66 IN | OXYGEN SATURATION: 98 % | BODY MASS INDEX: 19.77 KG/M2 | RESPIRATION RATE: 16 BRPM | HEART RATE: 86 BPM | TEMPERATURE: 97.5 F | DIASTOLIC BLOOD PRESSURE: 81 MMHG

## 2025-04-08 DIAGNOSIS — G43.811 OTHER MIGRAINE WITH STATUS MIGRAINOSUS, INTRACTABLE: ICD-10-CM

## 2025-04-08 DIAGNOSIS — G96.810 INTRACRANIAL HYPOTENSION: ICD-10-CM

## 2025-04-08 PROBLEM — K29.50 CHRONIC GASTRITIS: Status: ACTIVE | Noted: 2017-01-25

## 2025-04-08 PROBLEM — I82.C11 THROMBOSIS OF RIGHT INTERNAL JUGULAR VEIN (H): Status: ACTIVE | Noted: 2024-09-03

## 2025-04-08 PROBLEM — D64.9 ANEMIA: Status: ACTIVE | Noted: 2018-06-05

## 2025-04-08 PROBLEM — K51.90 UC (ULCERATIVE COLITIS) (H): Chronic | Status: ACTIVE | Noted: 2018-02-21

## 2025-04-08 PROBLEM — D75.9 HYPERVISCOSITY: Chronic | Status: ACTIVE | Noted: 2018-06-05

## 2025-04-08 PROBLEM — C88.00 WALDENSTROM'S MACROGLOBULINEMIA: Chronic | Status: ACTIVE | Noted: 2018-06-07

## 2025-04-08 PROBLEM — R42 DIZZINESS: Status: ACTIVE | Noted: 2018-06-05

## 2025-04-08 PROCEDURE — 99284 EMERGENCY DEPT VISIT MOD MDM: CPT | Mod: 25

## 2025-04-08 PROCEDURE — 96374 THER/PROPH/DIAG INJ IV PUSH: CPT

## 2025-04-08 PROCEDURE — 258N000003 HC RX IP 258 OP 636: Performed by: EMERGENCY MEDICINE

## 2025-04-08 PROCEDURE — 250N000011 HC RX IP 250 OP 636: Performed by: EMERGENCY MEDICINE

## 2025-04-08 PROCEDURE — 96375 TX/PRO/DX INJ NEW DRUG ADDON: CPT

## 2025-04-08 PROCEDURE — 96361 HYDRATE IV INFUSION ADD-ON: CPT

## 2025-04-08 RX ORDER — LORAZEPAM 2 MG/ML
1 INJECTION INTRAMUSCULAR ONCE
Status: COMPLETED | OUTPATIENT
Start: 2025-04-08 | End: 2025-04-08

## 2025-04-08 RX ORDER — DEXAMETHASONE SODIUM PHOSPHATE 10 MG/ML
10 INJECTION, SOLUTION INTRAMUSCULAR; INTRAVENOUS ONCE
Status: COMPLETED | OUTPATIENT
Start: 2025-04-08 | End: 2025-04-08

## 2025-04-08 RX ORDER — DIHYDROERGOTAMINE MESYLATE 1 MG/ML
1 INJECTION, SOLUTION INTRAMUSCULAR; INTRAVENOUS; SUBCUTANEOUS ONCE
Status: COMPLETED | OUTPATIENT
Start: 2025-04-08 | End: 2025-04-08

## 2025-04-08 RX ORDER — DIPHENHYDRAMINE HYDROCHLORIDE 50 MG/ML
50 INJECTION, SOLUTION INTRAMUSCULAR; INTRAVENOUS ONCE
Status: COMPLETED | OUTPATIENT
Start: 2025-04-08 | End: 2025-04-08

## 2025-04-08 RX ADMIN — SODIUM CHLORIDE 1000 ML: 0.9 INJECTION, SOLUTION INTRAVENOUS at 08:44

## 2025-04-08 RX ADMIN — DIHYDROERGOTAMINE MESYLATE 1 MG: 1 INJECTION INTRAMUSCULAR; INTRAVENOUS; SUBCUTANEOUS at 08:47

## 2025-04-08 RX ADMIN — PROCHLORPERAZINE EDISYLATE 10 MG: 5 INJECTION INTRAMUSCULAR; INTRAVENOUS at 08:46

## 2025-04-08 RX ADMIN — DIPHENHYDRAMINE HYDROCHLORIDE 50 MG: 50 INJECTION, SOLUTION INTRAMUSCULAR; INTRAVENOUS at 08:45

## 2025-04-08 RX ADMIN — DEXAMETHASONE SODIUM PHOSPHATE 10 MG: 10 INJECTION, SOLUTION INTRAMUSCULAR; INTRAVENOUS at 08:48

## 2025-04-08 RX ADMIN — LORAZEPAM 1 MG: 2 INJECTION INTRAMUSCULAR; INTRAVENOUS at 09:14

## 2025-04-08 ASSESSMENT — ACTIVITIES OF DAILY LIVING (ADL)
ADLS_ACUITY_SCORE: 41

## 2025-04-08 ASSESSMENT — COLUMBIA-SUICIDE SEVERITY RATING SCALE - C-SSRS
2. HAVE YOU ACTUALLY HAD ANY THOUGHTS OF KILLING YOURSELF IN THE PAST MONTH?: NO
6. HAVE YOU EVER DONE ANYTHING, STARTED TO DO ANYTHING, OR PREPARED TO DO ANYTHING TO END YOUR LIFE?: NO
1. IN THE PAST MONTH, HAVE YOU WISHED YOU WERE DEAD OR WISHED YOU COULD GO TO SLEEP AND NOT WAKE UP?: NO

## 2025-04-08 NOTE — ED TRIAGE NOTES
Patient states has a spinal leak and keeps getting migraines. States have been having spinal fluid leak symptoms for a year and has been getting worse in the past 2 months and in the last 2 weeks. Unsure how she got a spinal leak. Complains of a little nausea. States headache does not change with position currently.    Saw MD 2 weeks ago for the same symptoms.

## 2025-04-08 NOTE — ED PROVIDER NOTES
Emergency Department Note      History of Present Illness     Chief Complaint   Headache      HPI   Candace Schneider is a 59 year old female with a history of chronic migraine, Waldenstrom's macroglobulinemia and intracranial hypotension who presents tot he Emergency Department for evaluation of a headache. She states she has a history of a chronic migraine type headaches and its usually manageable with coffee and Maxalt. She states last night she had a similar, migraine type throbbing headache which was localized to the back of her head. She states she drank coffee and took Maxalt which relieved her headache temporarily only before coming back. She states since last night her symptoms have been coming back more often than usual. She also reports sensitivity to light. She reports she had a cup of coffee prior to arrival which has reduced the intensity of her headache. She reports nausea, no vomiting or vision changes. No abdominal pain. She denies any known allergies.     Independent Historian   None    Review of External Notes   I reviewed the neurology note from 03/14/2025.  Candace is very pleasant 59-year-old female I initially evaluated on 11/27/2024. As detailed in my initial consultation note she reported longstanding history of headaches diagnosed as migraine, usually episodic pattern manageable with triptan therapy. In 2018 was diagnosed with Waldenstrom's macroglobulinemia, responded to treatment with bortezomib, dexamethasone, rituximab (BDR), completed in November, 2018 then Rituximab maintenance December 2018-June 2019. Over the past couple of years has been experiencing morning headaches and pain when bending or with bowel movement in the back of the head like pulsation that would last for few minutes. Was also experiencing more frequent episodes of vertigo and night sweats. Oncologist recommended MRI brain, this was done with and without contrast on 4/5/2024, noted was pachymeningeal enhancement,  cerebellar ectopia, findings suspicious for CSF hypotension. MRI cervical through lumbar spine with and without contrast done 4/6/2024 showed no concerning findings. She was found to have recurrent Waldenstrom's macroglobulinemia and related hyperviscosity syndrome. She underwent plasmapheresis 4/6 and 4/7/2024. On 4/7/2024 had lumbar puncture, opening pressure 14-15 cm H2O, received intrathecal methotrexate. CSF analysis showed elevated IGG index 1.27, protein and glucose normal, 0 cells, negative meningitis/encephalitis panel, negative culture, cytology without morphologic evidence of malignancy, flow cytometry predominantly T-lymphocytes with essentially absent B lymphocytes. 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 On 4/15/2024 started treatment with bortezomib, dexamethasone and rituximab (BDR). Headache improved with initiation of treatment and completed treatments 10/10/2024. She was on Eliquis for acute right internal jugular vein thrombosis triggered by tunneled central vein catheter placement. She noted improvement of headaches other than her usual episodic migraines with visual aura, about 3 per month. Typical migraines are usually occipital at onset then right or left-sided associated with migraine symptoms, manageable with rizatriptan. Given improvement of headaches she elected clinical and imaging follow-up, as summarized further follow-up MRI brain showed similar findings suspicious for intracranial hypotension. She is reporting more consistent headaches, typically in the morning when she wakes and when she is more active or bending will get a superimposed headache more on the top of the head, intermittent hearing changes, intermittent visual symptoms similar to her migraine aura but not consistently with headache. She medicates her migraines with rizatriptan dissolvable effectively. She is going to have an eye exam on Monday. She had  "follow-up with Oncology on 3/6/2025. Blood work showing stable monoclonal protein.     Past Medical History     Medical History and Problem List   Anxiety   Chronic migraine   Gastritis   Ulcerative colitis   Intracranial hypotension   Waldenstrom's macroglobulinemia    Medications   Amilitriptyline   Diazepam   Duloxetine   Eliquis   Hydroxyzine   Linaclotide   Lorazepam   Mesalamine   Meclizine   Mesalamine   Rizatriptan     Surgical History   CVC tunnel   Physical Exam     Patient Vitals for the past 24 hrs:   BP Temp Temp src Pulse Resp SpO2 Height Weight   04/08/25 0900 119/81 -- -- 86 -- 98 % -- --   04/08/25 0727 104/67 97.5  F (36.4  C) Temporal 65 16 97 % 1.676 m (5' 6\") 55.8 kg (123 lb)     Physical Exam  Nursing note and vitals reviewed.    Constitutional:  Appears comfortable.    HENT:                Nose normal.  No discharge.      Oral mucosa is moist.  Eyes:    Pupils are equal.  Cardiovascular:  Normal rate, regular rhythm with normal S1 and S2.      Normal heart sounds and peripheral pulses 2+ and equal.    Pulmonary:  Effort normal and breath sounds clear to auscultation bilaterally.  GI:    Soft. No distension and no mass. No tenderness.   Musculoskeletal:  Normal range of motion. No extremity deformity.     No edema and no tenderness.    Neurological:   Alert and oriented. No focal weakness.  Skin:    Skin is warm and dry. No rash noted.   Psychiatric:   Behavior is normal. Appropriate mood and affect.     Judgment and thought content normal.      Diagnostics     Lab Results   Labs Ordered and Resulted from Time of ED Arrival to Time of ED Departure - No data to display    Imaging   No orders to display     Independent Interpretation   None    ED Course      Medications Administered   Medications   sodium chloride 0.9% BOLUS 1,000 mL (0 mLs Intravenous Stopped 4/8/25 0857)   prochlorperazine (COMPAZINE) injection 10 mg (10 mg Intravenous $Given 4/8/25 5527)   diphenhydrAMINE (BENADRYL) injection " 50 mg (50 mg Intravenous $Given 4/8/25 0845)   dexAMETHasone PF (DECADRON) injection 10 mg (10 mg Intravenous $Given 4/8/25 0848)   dihydroergotamine (DHE) injection 1 mg (1 mg Intravenous $Given 4/8/25 0847)   LORazepam (ATIVAN) injection 1 mg (1 mg Intravenous $Given 4/8/25 0914)       Procedures   Procedures     Discussion of Management   None    ED Course   ED Course as of 04/08/25 1218   Tue Apr 08, 2025 0737 I obtained the history and examined the patient as above.        Additional Documentation  None    Medical Decision Making / Diagnosis     CMS Diagnoses: None    MIPS       None    MDM   Candace Schneider is a 59 year old female with a history of intracranial hypotension and migraines comes in with recurrent migraines over the last few days.  She is followed by [facility] neurology and is trying to get in the [facility].  This headache is not positional, she was given IV fluids, Benadryl, Compazine, DHE, and dexamethasone.  She had some side effects with the Compazine so I did give her a milligram Ativan.  I came back in the room an hour later after the fluids were in and her headache was gone and she was tired.  I am hoping we broke the migraine headache.  Regarding the intracranial hypotension, she needs to follow-up with her neurologist and she is going to call them.  If she has recurrent headaches she needs to go to the hospital that her neurologist works at because it is very complicated and she had intrathecal injections and some other management and so she needs to be managed at [facility].    Fluids, go home and rest today.  Keep a headache journal.  Contact your neurologist today and if your headaches recur and get worse, go to either [facility] or [facility] emergency room where your neurologist work.  You might want to ask your neurology clinic which specific [facility] hospital that they work out of.  Disposition   The patient was discharged.     Diagnosis     ICD-10-CM    1. Other  migraine with status migrainosus, intractable  G43.811       2. Intracranial hypotension  G96.810            Discharge Medications   Discharge Medication List as of 4/8/2025 12:04 PM          Scribe Disclosure:  Regina VIRAMONTES, am serving as a scribe at 7:37 AM on 4/8/2025 to document services personally performed by Candace Gutierrez MD based on my observations and the provider's statements to me.        Candace Gutierrez MD  04/08/25 1212

## 2025-04-08 NOTE — DISCHARGE INSTRUCTIONS
Fluids, go home and rest today.  Keep a headache journal.  Contact your neurologist today and if your headaches recur and get worse, go to either [facility] or [facility] emergency room where your neurologist work.  You might want to ask your neurology clinic which [facility] that they work out of and go to the ER.